# Patient Record
Sex: FEMALE | URBAN - METROPOLITAN AREA
[De-identification: names, ages, dates, MRNs, and addresses within clinical notes are randomized per-mention and may not be internally consistent; named-entity substitution may affect disease eponyms.]

---

## 2019-07-24 ENCOUNTER — OFFICE VISIT (OUTPATIENT)
Dept: PRIMARY CARE CLINIC | Age: 34
End: 2019-07-24

## 2019-07-24 VITALS
OXYGEN SATURATION: 98 % | BODY MASS INDEX: 30.01 KG/M2 | HEART RATE: 86 BPM | DIASTOLIC BLOOD PRESSURE: 78 MMHG | SYSTOLIC BLOOD PRESSURE: 115 MMHG | RESPIRATION RATE: 16 BRPM | WEIGHT: 202.6 LBS | TEMPERATURE: 98.4 F | HEIGHT: 69 IN

## 2019-07-24 DIAGNOSIS — R05.9 COUGH: ICD-10-CM

## 2019-07-24 DIAGNOSIS — Z76.89 ENCOUNTER TO ESTABLISH CARE: ICD-10-CM

## 2019-07-24 DIAGNOSIS — J30.89 ENVIRONMENTAL AND SEASONAL ALLERGIES: Primary | ICD-10-CM

## 2019-07-24 DIAGNOSIS — H61.21 IMPACTED CERUMEN OF RIGHT EAR: ICD-10-CM

## 2019-07-24 NOTE — PROGRESS NOTES
This note will not be viewable in [address] Dennie Sitter is a 28y.o. year old female who is a new patient to me today. She was not previously followed by primary care. Followed by ob-gyn Dr. Harry Walker. Dennie Sitter is a  28 y.o. female presents for visit. Chief Complaint   Patient presents with   Danish Flax Establish Care    Cough     dry cough several weeks       Patient presents to establish care and with complaint of a dry cough that presented several weeks ago. Reports she has a random nonproductive cough hourly. Denies associated symptoms with exception of nasal congestion. Endorses a history of seasonal allergies and has not been taking antihistamines lately. Started Flonase nasal spray 2 days ago. Reports her grandmother recently  of GI cancer at age 66. Grandmother was diagnosed at stage IV and patient is concerned about her personal health. 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 Cough   The history is provided by the patient. This is a new problem. The current episode started more than 1 week ago. The problem occurs hourly. The problem has not changed since onset. Pertinent negatives include no chest pain, no abdominal pain, no headaches and no shortness of breath. Nothing aggravates the symptoms. Nothing relieves the symptoms. She has tried nothing for the symptoms. Review of Systems   Constitutional: Negative. HENT: Positive for congestion. Respiratory: Positive for cough. Negative for hemoptysis, sputum production, shortness of breath and wheezing. Cardiovascular: Negative for chest pain and palpitations. Gastrointestinal: Negative for abdominal pain. Neurological: Negative for headaches. Endo/Heme/Allergies: Positive for environmental allergies.         Past Medical History:   Diagnosis Date    Arrhythmia       Past Surgical History:   Procedure Laterality Date    HX  SECTION 07/2016        Social History     Tobacco Use    Smoking status: Never Smoker    Smokeless tobacco: Never Used   Substance Use Topics    Alcohol use: Yes     Alcohol/week: 1.0 standard drinks     Types: 1 Glasses of wine per week      Social History     Social History Narrative    Not on file     Family History   Problem Relation Age of Onset    Diabetes Maternal Grandmother     Cancer Maternal Grandmother         GI      Prior to Admission medications    Not on File      Allergies   Allergen Reactions    Other Food Itching     Carrots  Itching of throat          Visit Vitals  /78 (BP 1 Location: Left arm, BP Patient Position: Sitting)   Pulse 86   Temp 98.4 °F (36.9 °C) (Oral)   Resp 16   Ht 5' 9\" (1.753 m)   Wt 202 lb 9.6 oz (91.9 kg)   LMP 07/14/2019   SpO2 98%   BMI 29.92 kg/m²     Physical Exam   Constitutional: She is oriented to person, place, and time. She appears well-developed and well-nourished. No distress. HENT:   Head: Normocephalic and atraumatic. Right Ear: External ear normal.   Left Ear: Tympanic membrane and external ear normal.   Nose: Mucosal edema and rhinorrhea present. Total cerumen impaction right ear canal.  Unable to visualize TM   Eyes: Conjunctivae are normal.   Cardiovascular: Normal rate, regular rhythm and normal heart sounds. Pulmonary/Chest: Effort normal and breath sounds normal. She has no wheezes. She exhibits no tenderness. Abdominal: Soft. Bowel sounds are normal. She exhibits no distension. There is no tenderness. Musculoskeletal: She exhibits no edema. Lymphadenopathy:     She has no cervical adenopathy. Neurological: She is alert and oriented to person, place, and time. Skin: Skin is warm and dry. Psychiatric: She has a normal mood and affect. Her behavior is normal.   Nursing note and vitals reviewed. ASSESSMENT AND PLAN:  There are no active problems to display for this patient. ICD-10-CM ICD-9-CM   1.  Environmental and seasonal allergies J30.89 477.8   2. Cough R05 786.2   3. Impacted cerumen of right ear H61.21 380.4   4. Encounter to establish care Z76.89 V65.8     Orders Placed This Encounter    IA REMOVAL IMPACTED CERUMEN IRRIGATION/LVG UNILAT       Diagnoses and all orders for this visit:    1. Environmental and seasonal allergies      -   Daily antihistamine. Continue Flonase nasal spray daily. 2. Cough     -Most likely related to postnasal drip. Delsym as needed. 3. Impacted cerumen of right ear  -     IA REMOVAL IMPACTED CERUMEN IRRIGATION/LVG UNILAT     Minimize use of earbuds  -         4. Encounter to establish care        Follow-up and Dispositions    · Return in about 1 month (around 8/21/2019), or if symptoms worsen or fail to improve, for FULL Phyisal Exam.               Disclaimer:  Advised her to call back or return to office if symptoms worsen/change/persist.  Discussed expected course/resolution/complications of diagnosis in detail with patient. Medication risks/benefits/costs/interactions/alternatives discussed with patient. She was given an after visit summary which includes diagnoses, current medications, & vitals. She expressed understanding with the diagnosis and plan.

## 2019-07-24 NOTE — PROGRESS NOTES
Chief Complaint   Patient presents with    Establish Care    Cough     several weeks     Visit Vitals  /78 (BP 1 Location: Left arm, BP Patient Position: Sitting)   Pulse 86   Temp 98.4 °F (36.9 °C) (Oral)   Resp 16   Ht 5' 9\" (1.753 m)   Wt 202 lb 9.6 oz (91.9 kg)   SpO2 98%   BMI 29.92 kg/m²     1. Have you been to the ER, urgent care clinic since your last visit? Hospitalized since your last visit? No    2. Have you seen or consulted any other health care providers outside of the [address] [place] [address] since your last visit? Include any pap smears or colon screening.  No

## 2019-07-31 ENCOUNTER — OFFICE VISIT (OUTPATIENT)
Dept: PRIMARY CARE CLINIC | Age: 34
End: 2019-07-31

## 2019-07-31 VITALS
HEIGHT: 69 IN | HEART RATE: 99 BPM | TEMPERATURE: 98.3 F | SYSTOLIC BLOOD PRESSURE: 128 MMHG | WEIGHT: 199.6 LBS | DIASTOLIC BLOOD PRESSURE: 82 MMHG | OXYGEN SATURATION: 98 % | BODY MASS INDEX: 29.56 KG/M2

## 2019-07-31 DIAGNOSIS — Z00.00 ROUTINE GENERAL MEDICAL EXAMINATION AT A HEALTH CARE FACILITY: Primary | ICD-10-CM

## 2019-07-31 DIAGNOSIS — J30.89 ENVIRONMENTAL AND SEASONAL ALLERGIES: ICD-10-CM

## 2019-07-31 LAB
BILIRUB UR QL STRIP: NORMAL
GLUCOSE UR-MCNC: NEGATIVE MG/DL
KETONES P FAST UR STRIP-MCNC: NORMAL MG/DL
PH UR STRIP: 6 [PH] (ref 4.6–8)
PROT UR QL STRIP: NEGATIVE
SP GR UR STRIP: 1.02 (ref 1–1.03)
UA UROBILINOGEN AMB POC: NORMAL (ref 0.2–1)
URINALYSIS CLARITY POC: CLEAR
URINALYSIS COLOR POC: NORMAL
URINE BLOOD POC: NEGATIVE
URINE LEUKOCYTES POC: NEGATIVE
URINE NITRITES POC: NEGATIVE

## 2019-07-31 RX ORDER — MINERAL OIL
180 ENEMA (ML) RECTAL DAILY
Qty: 90 TAB | Refills: 1 | Status: SHIPPED | OUTPATIENT
Start: 2019-07-31

## 2019-07-31 NOTE — PROGRESS NOTES
Chief Complaint   Patient presents with    Physical     Visit Vitals  /82 (BP 1 Location: Left arm, BP Patient Position: Sitting)   Pulse 99   Temp 98.3 °F (36.8 °C) (Oral)   Ht 5' 9\" (1.753 m)   Wt 199 lb 9.6 oz (90.5 kg)   SpO2 98%   BMI 29.48 kg/m²     1. Have you been to the ER, urgent care clinic since your last visit? Hospitalized since your last visit? No    2. Have you seen or consulted any other health care providers outside of the [address] [place] [address] since your last visit? Include any pap smears or colon screening.  No

## 2019-07-31 NOTE — PROGRESS NOTES
This note will not be viewable in [address] Ute Chan is a  28 y.o. female presents for visit. Chief Complaint   Patient presents with    Physical       HPI      Patient presents for annual physical exam.  She will return for labs when fasting. Followed by OB/GYN. Unable to recall her last Tdap however her child is 1years old and may have received the vaccine when pregnant. Agrees to follow-up on that. Review of Systems   Respiratory: Positive for cough. Endo/Heme/Allergies: Positive for environmental allergies. All other systems reviewed and are negative. History reviewed. No pertinent past medical history. Past Surgical History:   Procedure Laterality Date    HX  SECTION  2016        Social History     Tobacco Use    Smoking status: Never Smoker    Smokeless tobacco: Never Used   Substance Use Topics    Alcohol use: Yes     Alcohol/week: 1.0 standard drinks     Types: 1 Glasses of wine per week      Social History     Social History Narrative    Not on file     Family History   Problem Relation Age of Onset    Diabetes Maternal Grandmother     Cancer Maternal Grandmother         GI      Prior to Admission medications    Medication Sig Start Date End Date Taking? Authorizing Provider   fexofenadine (ALLEGRA) 180 mg tablet Take 1 Tab by mouth daily. Indications: inflammation of the nose due to an allergy 19  Yes aVleri Rios NP      Allergies   Allergen Reactions    Other Food Itching     Carrots  Itching of throat          Visit Vitals  /82 (BP 1 Location: Left arm, BP Patient Position: Sitting)   Pulse 99   Temp 98.3 °F (36.8 °C) (Oral)   Ht 5' 9\" (1.753 m)   Wt 199 lb 9.6 oz (90.5 kg)   LMP 2019   SpO2 98%   BMI 29.48 kg/m²     Physical Exam   Constitutional: She is oriented to person, place, and time. Vital signs are normal. She appears well-developed and well-nourished. HENT:   Head: Normocephalic and atraumatic.    Right Ear: Hearing, tympanic membrane, external ear and ear canal normal.   Left Ear: Hearing, tympanic membrane, external ear and ear canal normal.   Nose: Mucosal edema and rhinorrhea present. Right sinus exhibits no maxillary sinus tenderness and no frontal sinus tenderness. Left sinus exhibits no maxillary sinus tenderness and no frontal sinus tenderness. Mouth/Throat: Uvula is midline, oropharynx is clear and moist and mucous membranes are normal.   Eyes: Pupils are equal, round, and reactive to light. Conjunctivae, EOM and lids are normal. Right eye exhibits no discharge and no exudate. Left eye exhibits no discharge and no exudate. No scleral icterus. Neck: Trachea normal and normal range of motion. Neck supple. Cardiovascular: Normal rate, regular rhythm, S1 normal, S2 normal, normal heart sounds and normal pulses. No murmur heard. Pulmonary/Chest: Effort normal and breath sounds normal. No accessory muscle usage. No respiratory distress. Abdominal: Soft. Normal appearance and bowel sounds are normal. There is no tenderness. Musculoskeletal: Normal range of motion. Lymphadenopathy:     She has no cervical adenopathy. Neurological: She is alert and oriented to person, place, and time. She has normal strength and normal reflexes. No cranial nerve deficit or sensory deficit. Skin: Skin is warm, dry and intact. No rash noted. Psychiatric: She has a normal mood and affect. Her speech is normal and behavior is normal. Judgment and thought content normal.   Nursing note and vitals reviewed.             Recent Results (from the past 24 hour(s))   AMB POC URINALYSIS DIP STICK AUTO W/O MICRO    Collection Time: 07/31/19 10:44 AM   Result Value Ref Range    Color (UA POC) Dark Yellow     Clarity (UA POC) Clear     Glucose (UA POC) Negative Negative    Bilirubin (UA POC) 1+ Negative    Ketones (UA POC) Trace Negative    Specific gravity (UA POC) 1.025 1.001 - 1.035    Blood (UA POC) Negative Negative    pH (UA POC) 6.0 4.6 - 8.0    Protein (UA POC) Negative Negative    Urobilinogen (UA POC) 1 mg/dL 0.2 - 1    Nitrites (UA POC) Negative Negative    Leukocyte esterase (UA POC) Negative Negative       ASSESSMENT AND PLAN:  There are no active problems to display for this patient. ICD-10-CM ICD-9-CM   1. Routine general medical examination at a health care facility Z00.00 V70.0   2. Environmental and seasonal allergies J30.89 477.8     Orders Placed This Encounter    METABOLIC PANEL, COMPREHENSIVE    CBC WITH AUTOMATED DIFF    TSH REFLEX TO T4    LIPID PANEL    AMB POC URINALYSIS DIP STICK AUTO W/O MICRO    fexofenadine (ALLEGRA) 180 mg tablet     Sig: Take 1 Tab by mouth daily. Indications: inflammation of the nose due to an allergy     Dispense:  90 Tab     Refill:  1       Diagnoses and all orders for this visit:    1. Routine general medical examination at a health care facility  -     METABOLIC PANEL, COMPREHENSIVE  -     CBC WITH AUTOMATED DIFF  -     TSH REFLEX TO T4  -     LIPID PANEL  -     AMB POC URINALYSIS DIP STICK AUTO W/O MICRO    2. Environmental and seasonal allergies  -     fexofenadine (ALLEGRA) 180 mg tablet; Take 1 Tab by mouth daily. Indications: inflammation of the nose due to an allergy        lab results and schedule of future lab studies reviewed with patient  reviewed diet, exercise and weight control        Follow-up and Dispositions    · Return in about 1 year (around 7/31/2020), or if symptoms worsen or fail to improve, for FULL Phyisal Exam.               Disclaimer:  Advised her to call back or return to office if symptoms worsen/change/persist.  Discussed expected course/resolution/complications of diagnosis in detail with patient. Medication risks/benefits/costs/interactions/alternatives discussed with patient. She was given an after visit summary which includes diagnoses, current medications, & vitals. She expressed understanding with the diagnosis and plan.

## 2019-08-06 ENCOUNTER — OFFICE VISIT (OUTPATIENT)
Dept: PRIMARY CARE CLINIC | Age: 34
End: 2019-08-06

## 2019-08-06 ENCOUNTER — TELEPHONE (OUTPATIENT)
Dept: PRIMARY CARE CLINIC | Age: 34
End: 2019-08-06

## 2019-08-06 VITALS
WEIGHT: 202.2 LBS | OXYGEN SATURATION: 98 % | HEIGHT: 69 IN | BODY MASS INDEX: 29.95 KG/M2 | DIASTOLIC BLOOD PRESSURE: 80 MMHG | RESPIRATION RATE: 18 BRPM | SYSTOLIC BLOOD PRESSURE: 123 MMHG | TEMPERATURE: 99.6 F | HEART RATE: 89 BPM

## 2019-08-06 DIAGNOSIS — R05.3 PERSISTENT DRY COUGH: Primary | ICD-10-CM

## 2019-08-06 DIAGNOSIS — J30.9 ALLERGIC RHINITIS, UNSPECIFIED SEASONALITY, UNSPECIFIED TRIGGER: ICD-10-CM

## 2019-08-06 RX ORDER — BENZONATATE 200 MG/1
200 CAPSULE ORAL
Qty: 42 CAP | Refills: 0 | Status: SHIPPED | OUTPATIENT
Start: 2019-08-06 | End: 2019-08-13

## 2019-08-06 NOTE — TELEPHONE ENCOUNTER
Called and spoke with patient and discussed, Patient's coughing is getting worse, and she feels chest pain at times. She mentions taking Allegra everyday. Patient agrees to 3:20PM appointment with Provider this afternoon. End of encounter.

## 2019-08-06 NOTE — PROGRESS NOTES
This note will not be viewable in [address] Ren Reyna is a  27 y.o. female presents for visit. Chief Complaint   Patient presents with    Cough     several weeks dry cough with chest tightness       HPI     Patient continues to experience a persistent dry cough that presented about 3 weeks ago. 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 Taking Allegra for allergies which has not been very effective. She has used Flonase Sensimist nasal spray that helped control her allergies in the past.    Review of Systems   Constitutional: Negative. HENT: Positive for congestion. Respiratory: Positive for cough. Negative for sputum production, shortness of breath and wheezing. Chest tightness- one episode now resolved. Endo/Heme/Allergies: Positive for environmental allergies. History reviewed. No pertinent past medical history. Past Surgical History:   Procedure Laterality Date    HX  SECTION  2016        Social History     Tobacco Use    Smoking status: Never Smoker    Smokeless tobacco: Never Used   Substance Use Topics    Alcohol use: Yes     Alcohol/week: 1.0 standard drinks     Types: 1 Glasses of wine per week      Social History     Social History Narrative    Not on file     Family History   Problem Relation Age of Onset    Diabetes Maternal Grandmother     Cancer Maternal Grandmother         GI      Prior to Admission medications    Medication Sig Start Date End Date Taking? Authorizing Provider   dextromethorphan hbr (ROBITUSSIN PEDIATRIC) 7.5 mg/5 mL Take  by mouth every four (4) hours as needed. Yes Provider, Historical   benzonatate (TESSALON) 200 mg capsule Take 1 Cap by mouth three (3) times daily as needed for Cough for up to 7 days. 19 Yes Juan Carlos iRos NP   fexofenadine (ALLEGRA) 180 mg tablet Take 1 Tab by mouth daily.  Indications: inflammation of the nose due to an allergy 19 Yes Harrington, Lani Moritz, NP      Allergies   Allergen Reactions    Other Food Itching     Carrots  Itching of throat          Visit Vitals  /80 (BP 1 Location: Left arm, BP Patient Position: Sitting)   Pulse 89   Temp 99.6 °F (37.6 °C) (Oral)   Resp 18   Ht 5' 9\" (1.753 m)   Wt 202 lb 3.2 oz (91.7 kg)   LMP 07/14/2019   SpO2 98%   BMI 29.86 kg/m²     Physical Exam   Constitutional: She is oriented to person, place, and time. She appears well-developed and well-nourished. No distress. HENT:   Head: Normocephalic and atraumatic. Right Ear: Tympanic membrane and external ear normal.   Left Ear: Tympanic membrane and external ear normal.   Nose: Mucosal edema and rhinorrhea present. Right sinus exhibits no maxillary sinus tenderness and no frontal sinus tenderness. Left sinus exhibits no maxillary sinus tenderness and no frontal sinus tenderness. Mouth/Throat: Uvula is midline. Eyes: Conjunctivae are normal.   Cardiovascular: Normal rate, regular rhythm and normal heart sounds. Pulmonary/Chest: Effort normal and breath sounds normal. She has no wheezes. Musculoskeletal: She exhibits no edema. Lymphadenopathy:     She has no cervical adenopathy. Neurological: She is alert and oriented to person, place, and time. Skin: Skin is warm and dry. Psychiatric: She has a normal mood and affect. Her behavior is normal.   Nursing note and vitals reviewed. No results found for this or any previous visit (from the past 24 hour(s)). ASSESSMENT AND PLAN:  There are no active problems to display for this patient. ICD-10-CM ICD-9-CM   1. Persistent dry cough R05 786.2   2.  Allergic rhinitis, unspecified seasonality, unspecified trigger J30.9 477.9     Orders Placed This Encounter    REFERRAL TO ALLERGY     Referral Priority:   Routine     Referral Type:   Consultation     Referral Reason:   Specialty Services Required     Referred to Provider:   Danielle Beaver MD     Requested Specialty: Allergy     Number of Visits Requested:   1    dextromethorphan hbr (ROBITUSSIN PEDIATRIC) 7.5 mg/5 mL     Sig: Take  by mouth every four (4) hours as needed.  benzonatate (TESSALON) 200 mg capsule     Sig: Take 1 Cap by mouth three (3) times daily as needed for Cough for up to 7 days. Dispense:  42 Cap     Refill:  0       Diagnoses and all orders for this visit:    1. Persistent dry cough  -     REFERRAL TO ALLERGY  -     benzonatate (TESSALON) 200 mg capsule; Take 1 Cap by mouth three (3) times daily as needed for Cough for up to 7 days. 2. Allergic rhinitis, unspecified seasonality, unspecified trigger  -     REFERRAL TO ALLERGY        the following changes in treatment are made: Add Flonase since he missed nasal spray to current allergy medications. Follow-up and Dispositions    · Return if symptoms worsen or fail to improve. Disclaimer:  Advised her to call back or return to office if symptoms worsen/change/persist.  Discussed expected course/resolution/complications of diagnosis in detail with patient. Medication risks/benefits/costs/interactions/alternatives discussed with patient. She was given an after visit summary which includes diagnoses, current medications, & vitals. She expressed understanding with the diagnosis and plan.

## 2019-08-06 NOTE — PROGRESS NOTES
Chief Complaint   Patient presents with    Cough     several weeks dry cough with chest tightness     Visit Vitals  /80 (BP 1 Location: Left arm, BP Patient Position: Sitting)   Pulse 89   Temp 99.6 °F (37.6 °C) (Oral)   Resp 18   Ht 5' 9\" (1.753 m)   Wt 202 lb 3.2 oz (91.7 kg)   SpO2 98%   BMI 29.86 kg/m²     1. Have you been to the ER, urgent care clinic since your last visit? Hospitalized since your last visit? No    2. Have you seen or consulted any other health care providers outside of the [address] [place] [address] since your last visit? Include any pap smears or colon screening.  No

## 2019-08-06 NOTE — TELEPHONE ENCOUNTER
Patient's coughing is getting worse, and she feels chest pain at times. She mentions taking Allegra everyday. Please advise.

## 2019-08-07 LAB
ALBUMIN SERPL-MCNC: 3.9 G/DL (ref 3.5–5.5)
ALBUMIN/GLOB SERPL: 1.6 {RATIO} (ref 1.2–2.2)
ALP SERPL-CCNC: 40 IU/L (ref 39–117)
ALT SERPL-CCNC: 19 IU/L (ref 0–32)
AST SERPL-CCNC: 42 IU/L (ref 0–40)
BASOPHILS # BLD AUTO: 0 X10E3/UL (ref 0–0.2)
BASOPHILS NFR BLD AUTO: 0 %
BILIRUB SERPL-MCNC: 0.6 MG/DL (ref 0–1.2)
BUN SERPL-MCNC: 8 MG/DL (ref 6–20)
BUN/CREAT SERPL: 11 (ref 9–23)
CALCIUM SERPL-MCNC: 8.8 MG/DL (ref 8.7–10.2)
CHLORIDE SERPL-SCNC: 108 MMOL/L (ref 96–106)
CHOLEST SERPL-MCNC: 140 MG/DL (ref 100–199)
CO2 SERPL-SCNC: 22 MMOL/L (ref 20–29)
CREAT SERPL-MCNC: 0.76 MG/DL (ref 0.57–1)
EOSINOPHIL # BLD AUTO: 0.1 X10E3/UL (ref 0–0.4)
EOSINOPHIL NFR BLD AUTO: 2 %
ERYTHROCYTE [DISTWIDTH] IN BLOOD BY AUTOMATED COUNT: 13 % (ref 12.3–15.4)
GLOBULIN SER CALC-MCNC: 2.5 G/DL (ref 1.5–4.5)
GLUCOSE SERPL-MCNC: 85 MG/DL (ref 65–99)
HCT VFR BLD AUTO: 34.2 % (ref 34–46.6)
HDLC SERPL-MCNC: 48 MG/DL
HGB BLD-MCNC: 10.8 G/DL (ref 11.1–15.9)
IMM GRANULOCYTES # BLD AUTO: 0 X10E3/UL (ref 0–0.1)
IMM GRANULOCYTES NFR BLD AUTO: 0 %
LDLC SERPL CALC-MCNC: 83 MG/DL (ref 0–99)
LYMPHOCYTES # BLD AUTO: 1 X10E3/UL (ref 0.7–3.1)
LYMPHOCYTES NFR BLD AUTO: 19 %
MCH RBC QN AUTO: 28.3 PG (ref 26.6–33)
MCHC RBC AUTO-ENTMCNC: 31.6 G/DL (ref 31.5–35.7)
MCV RBC AUTO: 90 FL (ref 79–97)
MONOCYTES # BLD AUTO: 0.5 X10E3/UL (ref 0.1–0.9)
MONOCYTES NFR BLD AUTO: 10 %
NEUTROPHILS # BLD AUTO: 3.6 X10E3/UL (ref 1.4–7)
NEUTROPHILS NFR BLD AUTO: 69 %
PLATELET # BLD AUTO: 253 X10E3/UL (ref 150–450)
POTASSIUM SERPL-SCNC: 4.2 MMOL/L (ref 3.5–5.2)
PROT SERPL-MCNC: 6.4 G/DL (ref 6–8.5)
RBC # BLD AUTO: 3.81 X10E6/UL (ref 3.77–5.28)
SODIUM SERPL-SCNC: 141 MMOL/L (ref 134–144)
TRIGL SERPL-MCNC: 45 MG/DL (ref 0–149)
TSH SERPL DL<=0.005 MIU/L-ACNC: 2.02 UIU/ML (ref 0.45–4.5)
VLDLC SERPL CALC-MCNC: 9 MG/DL (ref 5–40)
WBC # BLD AUTO: 5.3 X10E3/UL (ref 3.4–10.8)

## 2019-08-08 NOTE — PROGRESS NOTES
Left a voice mail regarding labs, stable no issues, encouraged to call the office with questions or concerns. Lab letter sent. End of encounter.

## 2021-02-24 ENCOUNTER — OFFICE VISIT (OUTPATIENT)
Dept: PRIMARY CARE CLINIC | Age: 36
End: 2021-02-24
Payer: COMMERCIAL

## 2021-02-24 VITALS
BODY MASS INDEX: 32.73 KG/M2 | WEIGHT: 221 LBS | HEART RATE: 75 BPM | OXYGEN SATURATION: 99 % | SYSTOLIC BLOOD PRESSURE: 119 MMHG | RESPIRATION RATE: 16 BRPM | DIASTOLIC BLOOD PRESSURE: 76 MMHG | HEIGHT: 69 IN

## 2021-02-24 DIAGNOSIS — K59.00 CONSTIPATION, UNSPECIFIED CONSTIPATION TYPE: ICD-10-CM

## 2021-02-24 DIAGNOSIS — Z80.0 FAMILY HISTORY OF STOMACH CANCER: ICD-10-CM

## 2021-02-24 DIAGNOSIS — Z11.59 NEED FOR HEPATITIS C SCREENING TEST: ICD-10-CM

## 2021-02-24 DIAGNOSIS — Z00.00 LABORATORY TESTS ORDERED AS PART OF A COMPLETE PHYSICAL EXAM (CPE): Primary | ICD-10-CM

## 2021-02-24 PROCEDURE — 99395 PREV VISIT EST AGE 18-39: CPT | Performed by: NURSE PRACTITIONER

## 2021-02-24 RX ORDER — MELATONIN 5 MG
10 CAPSULE ORAL
COMMUNITY

## 2021-02-24 NOTE — PROGRESS NOTES
This note will not be viewable in [address] Saad Alvarez is a  38 y.o. female presents for visit. Chief Complaint   Patient presents with    Physical       HPI    Presents for annual physical. Will return for labs when fasting. Endorses constipation since starting Slim Fast for weight loss. Takes additional fiber supplement daily but it has not been effective. Grandmother was diagnosed with stomach cancer. Amy Kelly is concerned about her risk of stomach cancer. 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 Review of Systems   Gastrointestinal: Positive for constipation. Negative for blood in stool. Endo/Heme/Allergies: Positive for environmental allergies. Psychiatric/Behavioral: The patient does not have insomnia (melatonin effective. ). All other systems reviewed and are negative. History reviewed. No pertinent past medical history. Past Surgical History:   Procedure Laterality Date    HX  SECTION  2016        Social History     Tobacco Use    Smoking status: Never Smoker    Smokeless tobacco: Never Used   Substance Use Topics    Alcohol use: Yes     Alcohol/week: 1.0 standard drinks     Types: 1 Glasses of wine per week      Social History     Social History Narrative    Not on file     Family History   Problem Relation Age of Onset    Diabetes Father     Hypertension Father     Diabetes Maternal Grandmother     Cancer Maternal Grandmother         GI      Prior to Admission medications    Medication Sig Start Date End Date Taking? Authorizing Provider   melatonin 5 mg cap capsule Take 10 mg by mouth nightly. Takes two at night time   Yes Provider, Historical   ascorbic acid, vitamin C, (ascorbic acid) 100 mg tab Take 100 mg by mouth.  21  Provider, Historical   dextromethorphan hbr (ROBITUSSIN PEDIATRIC) 7.5 mg/5 mL Take  by mouth every four (4) hours as needed. 21  Provider, Historical   fexofenadine (ALLEGRA) 180 mg tablet Take 1 Tab by mouth daily. Indications: inflammation of the nose due to an allergy 7/31/19   Debby Rios, NP      Allergies   Allergen Reactions    Other Food Itching     Carrots  Itching of throat          Visit Vitals  /76 (BP 1 Location: Right upper arm, BP Patient Position: Sitting, BP Cuff Size: Adult)   Pulse 75   Resp 16   Ht 5' 9\" (1.753 m)   Wt 221 lb (100.2 kg)   LMP 01/28/2021 (Exact Date)   SpO2 99%   BMI 32.64 kg/m²     Physical Exam  Vitals signs and nursing note reviewed. Constitutional:       Appearance: Normal appearance. She is well-developed. She is obese. HENT:      Head: Normocephalic and atraumatic. Right Ear: Hearing and external ear normal.      Left Ear: Hearing and external ear normal.      Nose:      Right Sinus: No frontal sinus tenderness. Left Sinus: No frontal sinus tenderness. Eyes:      General: Lids are normal. No scleral icterus. Right eye: No discharge. Left eye: No discharge. Conjunctiva/sclera: Conjunctivae normal.      Right eye: No exudate. Left eye: No exudate. Pupils: Pupils are equal, round, and reactive to light. Neck:      Musculoskeletal: Normal range of motion and neck supple. Thyroid: No thyromegaly. Trachea: Phonation normal.   Cardiovascular:      Rate and Rhythm: Normal rate and regular rhythm. Pulses: Normal pulses. Heart sounds: Normal heart sounds, S1 normal and S2 normal. No murmur. Pulmonary:      Effort: Pulmonary effort is normal. No accessory muscle usage or respiratory distress. Breath sounds: Normal breath sounds. Abdominal:      General: Bowel sounds are normal.      Palpations: Abdomen is soft. Tenderness: There is no abdominal tenderness. Musculoskeletal: Normal range of motion. Lymphadenopathy:      Cervical: No cervical adenopathy. Skin:     General: Skin is warm and dry. Findings: No rash.    Neurological:      Mental Status: She is alert and oriented to person, place, and time.      Cranial Nerves: No cranial nerve deficit. Sensory: No sensory deficit. Deep Tendon Reflexes: Reflexes are normal and symmetric. Psychiatric:         Attention and Perception: Attention normal.         Mood and Affect: Mood normal.         Speech: Speech normal.         Behavior: Behavior normal.               No results found for this or any previous visit (from the past 24 hour(s)). ASSESSMENT AND PLAN:  There are no active problems to display for this patient. ICD-10-CM ICD-9-CM   1. Laboratory tests ordered as part of a complete physical exam (CPE)  Z00.00 V72.62   2. Constipation, unspecified constipation type  K59.00 564.00   3. Need for hepatitis C screening test  Z11.59 V73.89   4. Family history of stomach cancer  Z80.0 V16.0     Orders Placed This Encounter    METABOLIC PANEL, COMPREHENSIVE     Standing Status:   Future     Standing Expiration Date:   2/24/2022    CBC W/O DIFF     Standing Status:   Future     Standing Expiration Date:   2/24/2022    LIPID PANEL     Standing Status:   Future     Standing Expiration Date:   2/24/2022    HCV AB W/RFLX TO SAIRA     Standing Status:   Future     Standing Expiration Date:   2/24/2022    TSH 3RD GENERATION     Standing Status:   Future     Standing Expiration Date:   2/24/2022    URINALYSIS W/ REFLEX CULTURE     Standing Status:   Future     Standing Expiration Date:   2/24/2022    REFERRAL TO GASTROENTEROLOGY     Referral Priority:   Routine     Referral Type:   Consultation     Referral Reason:   Specialty Services Required     Referred to Provider:   Adrienne Lyle MD     Requested Specialty:   Gastroenterology     Number of Visits Requested:   1    melatonin 5 mg cap capsule     Sig: Take 10 mg by mouth nightly. Takes two at night time    DISCONTD: ascorbic acid, vitamin C, (ascorbic acid) 100 mg tab     Sig: Take 100 mg by mouth. Diagnoses and all orders for this visit:    1.  Laboratory tests ordered as part of a complete physical exam (CPE)  -     METABOLIC PANEL, COMPREHENSIVE; Future  -     CBC W/O DIFF; Future  -     LIPID PANEL; Future  -     HCV AB W/RFLX TO SAIRA; Future  -     TSH 3RD GENERATION; Future  -     URINALYSIS W/ REFLEX CULTURE; Future    2. Constipation, unspecified constipation type  -     REFERRAL TO GASTROENTEROLOGY    3. Need for hepatitis C screening test  -     HCV AB W/RFLX TO SAIRA; Future    4. Family history of stomach cancer  -     REFERRAL TO GASTROENTEROLOGY        lab results and schedule of future lab studies reviewed with patient  reviewed diet, exercise and weight control        Follow-up and Dispositions    · Return in about 1 year (around 2/24/2022), or if symptoms worsen or fail to improve, for FULL Phyisal Exam.               Disclaimer:  Advised her to call back or return to office if symptoms worsen/change/persist.  Discussed expected course/resolution/complications of diagnosis in detail with patient. Medication risks/benefits/costs/interactions/alternatives discussed with patient. She was given an after visit summary which includes diagnoses, current medications, & vitals. She expressed understanding with the diagnosis and plan.

## 2021-02-24 NOTE — PATIENT INSTRUCTIONS
A Healthy Lifestyle: Care Instructions Your Care Instructions A healthy lifestyle can help you feel good, stay at a healthy weight, and have plenty of energy for both work and play. A healthy lifestyle is something you can share with your whole family. A healthy lifestyle also can lower your risk for serious health problems, such as high blood pressure, heart disease, and diabetes. You can follow a few steps listed below to improve your health and the health of your family. Follow-up care is a key part of your treatment and safety. Be sure to make and go to all appointments, and call your doctor if you are having problems. It's also a good idea to know your test results and keep a list of the medicines you take. How can you care for yourself at home? · Do not eat too much sugar, fat, or fast foods. You can still have dessert and treats now and then. The goal is moderation. · Start small to improve your eating habits. Pay attention to portion sizes, drink less juice and soda pop, and eat more fruits and vegetables. ? Eat a healthy amount of food. A 3-ounce serving of meat, for example, is about the size of a deck of cards. Fill the rest of your plate with vegetables and whole grains. ? Limit the amount of soda and sports drinks you have every day. Drink more water when you are thirsty. ? Eat at least 5 servings of fruits and vegetables every day. It may seem like a lot, but it is not hard to reach this goal. A serving or helping is 1 piece of fruit, 1 cup of vegetables, or 2 cups of leafy, raw vegetables. Have an apple or some carrot sticks as an afternoon snack instead of a candy bar.  Try to have fruits and/or vegetables at every meal. 
 · Make exercise part of your daily routine. You may want to start with simple activities, such as walking, bicycling, or slow swimming. Try to be active 30 to 60 minutes every day. You do not need to do all 30 to 60 minutes all at once. For example, you can exercise 3 times a day for 10 or 20 minutes. Moderate exercise is safe for most people, but it is always a good idea to talk to your doctor before starting an exercise program. 
· Keep moving. [employer] the lawn, work in the garden, or [employer]. Take the stairs instead of the elevator at work. · If you smoke, quit. People who smoke have an increased risk for heart attack, stroke, cancer, and other lung illnesses. Quitting is hard, but there are ways to boost your chance of quitting tobacco for good. ? Use nicotine gum, patches, or lozenges. ? Ask your doctor about stop-smoking programs and medicines. ? Keep trying. In addition to reducing your risk of diseases in the future, you will notice some benefits soon after you stop using tobacco. If you have shortness of breath or asthma symptoms, they will likely get better within a few weeks after you quit. · Limit how much alcohol you drink. Moderate amounts of alcohol (up to 2 drinks a day for men, 1 drink a day for women) are okay. But drinking too much can lead to liver problems, high blood pressure, and other health problems. Family health If you have a family, there are many things you can do together to improve your health. · Eat meals together as a family as often as possible. · Eat healthy foods. This includes fruits, vegetables, lean meats and dairy, and whole grains. · Include your family in your fitness plan. Most people think of activities such as jogging or tennis as the way to fitness, but there are many ways you and your family can be more active. Anything that makes you breathe hard and gets your heart pumping is exercise. Here are some tips: ? Walk to do errands or to take your child to school or the bus. 
? Go for a family bike ride after dinner instead of watching TV. Where can you learn more? Go to http://www.gray.com/ Enter L231 in the search box to learn more about \"A Healthy Lifestyle: Care Instructions. \" Current as of: January 31, 2020               Content Version: 12.6 © 2006-2020 [employer], CONEXANCE MD. Care instructions adapted under license by [employer] (which disclaims liability or warranty for this information). If you have questions about a medical condition or this instruction, always ask your healthcare professional. [address] any warranty or liability for your use of this information. Patient information on fall and injury prevention

## 2021-02-24 NOTE — PROGRESS NOTES
Chief Complaint   Patient presents with    Physical     1. Have you been to the ER, urgent care clinic since your last visit? Hospitalized since your last visit? Yes Where: [facility] December 2. Have you seen or consulted any other health care providers outside of the [address] [place] [address] since your last visit? Include any pap smears or colon screening. No    Patient has Pap scheduled next month with GYN.

## 2021-02-24 NOTE — LETTER
NOTIFICATION RETURN TO WORK / SCHOOL 
 
2/24/2021 3:26 PM 
 
Ms. Mancini [address] [place] [id number] To Whom It May Concern: 
 
Mario Bravo is currently under the care of Brianna Hughes. She will return to work/school on: 2/25/21 If there are questions or concerns please have the patient contact our office.  
 
 
 
Sincerely, 
 
 
Mayur Escobar NP

## 2023-05-23 RX ORDER — FEXOFENADINE HCL 180 MG/1
180 TABLET ORAL DAILY
COMMUNITY
Start: 2019-07-31

## 2023-07-05 ENCOUNTER — OFFICE VISIT (OUTPATIENT)
Age: 38
End: 2023-07-05
Payer: COMMERCIAL

## 2023-07-05 VITALS — BODY MASS INDEX: 32.58 KG/M2 | WEIGHT: 220 LBS | HEIGHT: 69 IN

## 2023-07-05 DIAGNOSIS — R22.31 AXILLARY MASS, RIGHT: Primary | ICD-10-CM

## 2023-07-05 PROCEDURE — 99203 OFFICE O/P NEW LOW 30 MIN: CPT | Performed by: SURGERY
